# Patient Record
Sex: MALE | Race: WHITE | Employment: FULL TIME | URBAN - METROPOLITAN AREA
[De-identification: names, ages, dates, MRNs, and addresses within clinical notes are randomized per-mention and may not be internally consistent; named-entity substitution may affect disease eponyms.]

---

## 2020-09-10 ENCOUNTER — TELEPHONE (OUTPATIENT)
Dept: NEUROLOGY | Age: 60
End: 2020-09-10

## 2020-09-10 NOTE — TELEPHONE ENCOUNTER
----- Message from Ivy Weldon sent at 9/10/2020 12:52 PM EDT -----  Regarding: Dr. Osito Butts General Message/Vendor Calls    Caller's first and last name: Anisa Bailey (wife)      Reason for call: Regarding scheduling NP neurology eval referred by Dr. Gloria Pierre [phone].        Call back required yes/no and why: Yes       Best contact number(s): [phone]      Details to clarify the request:      Ivy Weldon

## 2020-09-29 ENCOUNTER — VIRTUAL VISIT (OUTPATIENT)
Dept: NEUROLOGY | Age: 60
End: 2020-09-29
Payer: COMMERCIAL

## 2020-09-29 DIAGNOSIS — G31.84 MILD COGNITIVE IMPAIRMENT WITH MEMORY LOSS: Primary | ICD-10-CM

## 2020-09-29 DIAGNOSIS — F43.22 ACUTE ADJUSTMENT DISORDER WITH ANXIETY: ICD-10-CM

## 2020-09-29 PROCEDURE — 96116 NUBHVL XM PHYS/QHP 1ST HR: CPT | Performed by: PSYCHOLOGIST

## 2020-09-29 NOTE — PROGRESS NOTES
This note will not be viewable in [address] Pursuant to the emergency declaration under the [facility], [facility] waiver authority and the [employer] Act, this Virtual Visit was conducted, with appropriate consent obtained, to reduce the patient's risk of exposure to COVID-19 and provide continuity of care   Services were provided in this manner to substitute for in-person clinic visit. The originating site is the patient's home and the distance site is [facility] at [facility]. These types of teleneuropsychology/telehealth/telemedicine visits were authorized by the President of the [employer], though I/we cannot guarantee what a third party payor will do reimbursement/coverage wise. I indicated that I would evaluate the patient and recommend diagnostics and treatment based on my assessment and impressions, and that our sessions are not being recorded and that personal health information is protected to the best of our abilities. [facility] at [address]    Office:  [phone]  Fax: [phone]                 Initial Office Exam    Patient Name: Lakeisha Mohan  Age: 61 y.o. Gender: male   Occupation:   Handedness: right handed   Presenting Concern: Memory Decline  Primary Care Physician: Rickey Rojas,   Referring Provider: Rubia Loza MD      REASON FOR REFERRAL:  This comprehensive and medically necessary neuropsychological assessment was requested to assist with a differential diagnosis of Memory disorder.   The use and purpose of this examination, as well as the extent and limitations of confidentiality, were explained prior to obtaining permission to participate. Instructions were provided regarding the necessity to put forth optimal effort and answer questions truthfully in order to obtain reliable and accurate test results. PERTINENT HISTORY:  Mr. Leena Turner presented for a neuropsychological assessment at the recommendation of his treating physician secondary to complaints of declining memory staring about  8 months ago and getting gradually worse. He has also reported agitation and mood swings. He has reported symptoms that include some mild visuospatial changes and slowed processing speed. He reports no significant concussions or other medical problems in his self or family. From a brief review of his medical and personal history there has not been any other significant neurological injury or illness noted or reported. He did not report experiencing depression or anxiety in the past.  He is currently on Zoloft to control so anxiety. Mr. Leena Turner does not  report any problems at birth or difficulties meeting developmental milestones. He reports that he had an adequate level of family support and was not subject to trauma or abuse as a child. Mr. Leena Turner does not  report being retain in school or receiving special assistance in any of he classes or subjects. Mr. Leena Turner completed 16 years of education. He attended [employer] [place] where he played basketball. Mr. Leena Turner does not exercise on a regular basis, but lives an active lifestyle and does  maintain a balanced diet. He does not report problems with sleep and does not complain of pain. He does participate in mentally stimulating activities. Mr. Leena Turner does  have concerns regarding work stressors, health concerns, mother's health, and other unspecified family stressors.   Mr. Leena Turner indicated that he is independent in his instrumental activities of daily living, including shopping, meal preparation, housekeeping, doing laundry, driving a car, managing medications, and finances. No current outpatient medications on file. No current facility-administered medications for this visit. No past medical history on file. No flowsheet data found. No data recorded    No past surgical history on file. Social History     Socioeconomic History    Marital status:      Spouse name: Not on file    Number of children: Not on file    Years of education: Not on file    Highest education level: Not on file       No family history on file. CT Results (most recent):  No results found for this or any previous visit. MRI Results (most recent):  No results found for this or any previous visit. MENTAL STATUS:    Orientation:  Fully oriented   Eye Contact:  Appropriate eye contact   Motor Behavior:   Ambulates independently   Speech:   Fluent and intelligible   Thought Process:  Clear and coherent   Thought Content:  No evidence of hallucinations or delusions   Suicidal ideations:  Denies   Mood:   Mildly anxious   Affect:   Congruent with stated mood   Concentration:   Decreased   Abstraction:   Within normal limits   Insight:   Adequate   Significant visuo-constructional apraxia  On the Modified Mini-Mental Status Exam: 89/100 (11%ile) Low Average      DIAGNOSTIC IMPRESSIONS:    ICD-10-CM ICD-9-CM    1. Mild cognitive impairment with memory loss  G31.84 331.83    2. Acute adjustment disorder with anxiety  F43.22 309.24      R/O Dementia        PLAN:  1. Complete a comprehensive neuropsychological assessment to provide a differential diagnosis of presenting concerns as well as to assist with disposition and treatment planning as appropriate. 2. Consider compensatory and remedial cognitive training. 3. Consider nonpharmacological interventions for anxiety. 4. Consider an adaptive driving evaluation. 91864 x 1 Review of records. Face to face interview w/ patient. Determine test protocol: 60 minutes.  Total 1 unit        Manish Schroeder, PhD, ABPP, CATIE  Licensed Clinical Psychologist/ Neuropsychologist        This note will not be viewable in [address]

## 2020-11-02 ENCOUNTER — OFFICE VISIT (OUTPATIENT)
Dept: NEUROLOGY | Age: 60
End: 2020-11-02
Payer: COMMERCIAL

## 2020-11-02 DIAGNOSIS — F43.22 ADJUSTMENT DISORDER WITH ANXIETY: ICD-10-CM

## 2020-11-02 DIAGNOSIS — F03.90 MAJOR NEUROCOGNITIVE DISORDER DUE TO ALZHEIMER'S DISEASE, PROBABLE, WITHOUT BEHAVIORAL DISTURBANCE: Primary | ICD-10-CM

## 2020-11-02 PROCEDURE — 96137 PSYCL/NRPSYC TST PHY/QHP EA: CPT | Performed by: PSYCHOLOGIST

## 2020-11-02 PROCEDURE — 96133 NRPSYC TST EVAL PHYS/QHP EA: CPT | Performed by: PSYCHOLOGIST

## 2020-11-02 PROCEDURE — 96138 PSYCL/NRPSYC TECH 1ST: CPT | Performed by: PSYCHOLOGIST

## 2020-11-02 PROCEDURE — 96136 PSYCL/NRPSYC TST PHY/QHP 1ST: CPT | Performed by: PSYCHOLOGIST

## 2020-11-02 PROCEDURE — 96132 NRPSYC TST EVAL PHYS/QHP 1ST: CPT | Performed by: PSYCHOLOGIST

## 2020-11-02 PROCEDURE — 96139 PSYCL/NRPSYC TST TECH EA: CPT | Performed by: PSYCHOLOGIST

## 2020-11-02 NOTE — PROGRESS NOTES
This note will not be viewable in [employer] for the following reason(s). Likely risk of substantial harm from The misinterpretation of data that is generated by this evaluation. Mr. Raymundo Munoz will receive a written copy of his full report and results during the feedback session. [facility] at [address]    Office:  [phone]  Fax: [phone]                                               Neuropsychological Evaluation Report    Patient Name: Mukesh Gandara  Age: 61 y.o. Gender: male   Occupation:   Handedness: right handed   Presenting Concern: Memory Decline  Primary Care Physician: Daisy Almodovar,   Referring Provider: Fausto Cosme MD       PATIENT HISTORY (OBTAINED DURING INITIAL CLINICAL EVALUATION):    REASON FOR REFERRAL:  This comprehensive and medically necessary neuropsychological assessment was requested to assist with a differential diagnosis of Memory disorder. The use and purpose of this examination, as well as the extent and limitations of confidentiality, were explained prior to obtaining permission to participate. Instructions were provided regarding the necessity to put forth optimal effort and answer questions truthfully in order to obtain reliable and accurate test results.     PERTINENT HISTORY:  Mr. Raymundo Munoz presented for a neuropsychological assessment at the recommendation of his treating physician secondary to complaints of declining memory staring about  8 months ago and getting gradually worse. He has also reported agitation and mood swings. He has reported symptoms that include some mild visuospatial changes and slowed processing speed. He reports no significant concussions or other medical problems in his self or family.  From a brief review of his medical and personal history there has not been any other significant neurological injury or illness noted or reported. He did not report experiencing depression or anxiety in the past.  He is currently on Zoloft to control so anxiety.     Mr. Raymundo Munoz does not  report any problems at birth or difficulties meeting developmental milestones. He reports that he had an adequate level of family support and was not subject to trauma or abuse as a child. Mr. Raymundo Munoz does not  report being retain in school or receiving special assistance in any of he classes or subjects. Mr. Raymundo Munoz completed 16 years of education. He attended [employer] [facility] [place] where he played basketball.     Mr. Raymundo Munoz does not exercise on a regular basis, but lives an active lifestyle and does  maintain a balanced diet. He does not report problems with sleep and does not complain of pain. He does participate in mentally stimulating activities. Mr. Raymundo Munoz does  have concerns regarding work stressors, health concerns, mother's health, and other unspecified family stressors. Mr. Raymundo Munoz indicated that he is independent in his instrumental activities of daily living, including shopping, meal preparation, housekeeping, doing laundry, driving a car, managing medications, and finances.       METHODS OF ASSESSMENT (Current Evaluation):  Clinician Administered:  Clinical Interview  Review of Medical Records  Clock Drawing Task  Modified Mini-Mental Status Exam (3MS)  Test of Premorbid [place] 488606 Anxiety and Depression Scale  Revised Memory and Behavior Checklist    Technician Administered:        Lopez Judgment of Line Orientation   Neuropsychological Assessment Battery   NAB: Judgment, visual discrimination, writing  RBANS-A  Reliable Digit Span  Test of Memory Malingering  Test of Practical Judgment (9-Item)  [place] Functional Living Scale  Trail Making Test  Word Choice Effort Test (ACS)    TEST OBSERVATIONS:  Mr. Raymundo Munoz arrived promptly for the testing session.   Dress and grooming were appropriate; physical presentation was unchanged from that observed during the clinical interview. Speech was fluent, intelligible, and goal-directed. Affect was congruent with the euthymic mood conveyed. Mr. Tod Hopson was adequately cooperative and appeared to put forth good effort throughout this examination. Rapport with the examiner was adequately established and maintained. Minimal prompting was required. Comprehension of test instructions was not problematic. Performance motivation was objectively measured by four instruments (TOMM, WC Effort, Reliable Digit Span, RBANS EI), and Mr. Tod Hopson produced a normal score on three of these measures. Accordingly, test findings below do not appear to be the product of disingenuous effort. Given the above observations, plus comments contained in the Mental Status section, the results of this examination are regarded as reasonably reliable and valid. TEST RESULTS:  Quantitative test results are derived from comparisons to age and education corrected cohort normative data, where applicable. Percentiles are included in these instances. Qualitative test results are determined using clinical observations. General Orientation and Awareness:       Orientation to person, year, month, day of month, day of week, state, town, and circumstance.    Awareness of deficits:  Aware                   Cognitive performance validity testing: VALID        Attention/Concentration:      Classification:   Coding (<0.1 percentile)           Severely Impaired  Simple visuomotor tracking (<0.1 percentile)               Severely Impaired  Digits forward (2 percentile)                 Moderately Impaired  Digits backward (4 percentile)                 Moderately Impaired    Visuospatial and Constructional Praxis:     Figure copy (25 percentile)                                       Average  Line orientation (<1 percentile)                                                  Severely Impaired   Visual discrimination (1 percentile)                Severely Impaired    Language:         Picture naming (<0.1 percentile)                               Severely Impaired  Semantic fluency (4 percentile)                    Moderately Impaired  Writing (69 percentile)                     Average     Memory and Learning:       Immediate word list learning (2 percentile)               Moderately Impaired  Delayed word list recall (50 percentile)                  Average  Delayed word list recognition (37 percentile)               Average  Immediate story memory (0.1 percentile)                  Severely Impaired  Delayed story recall (5 percentile)                Mildly Impaired  Delayed figure recall (8 percentile)                Mildly Impaired    Cognitive Tests of Executive Functioning:     Ability to think flexibly, Trail Making B (<0.1 percentile)              Severely Impaired  Simple judgment in daily decision making (14 percentile)             Low Average  Complex judgment in daily decision making (20 percentile)              Low Average    Intellectual and Basic Cognitive Functioning (WAIS-IV):  ACS Test of pre-morbid functioning reading recognition lower limit estimated WAIS-IV FSIQ score:       Estimated Full Scale IQ:          107   67 percentile       Average    Emotional Functioning:   HADS:   Depression = 4  Normal Range,   Anxiety = 7   Normal Range     GREER:  Mr. Chuck Holt was administered the GREER as part of his battery to assess his level of emotional functioning. Examination of his validity scales on this measure suggests a 6 that he has somewhat of a tendency to present himself in a favorable light, and is having relatively few common shortcomings to which most will admit.   His reluctance appears limited primarily to negative consequences associated with his behavior as a result he may minimize problems particularly with respect to disruptive effects that such problems have upon his life and the lives of others. Patients with a defensive style may play down areas where functioning might be less than optimal.  His overall clinical profile was entirely within normal limits there. There are no indications of significant psychopathology that are tapped by individual clinical scales of this measure. IMPRESSIONS:    Mr. Beulah Nevarez was seen for a comprehensive neuropsychological evaluation and administered a battery of measures assessing the neurocognitive domains of attention, language, visual-spatial, memory, and executive functioning his overall level of functioning was in the moderately impaired range averaged across the 5 domains. His individual domains ranged from severely impaired to mildly impaired. On the neurocognitive domains of language, memory, and executive functioning his performance all fell at the domain level in the mildly impaired range. His performance on individual measures of confrontational naming, semantic fluency, immediate word list learning, immediate and delayed story recall, and cognitive flexibility delayed figural recall were all in the impaired range. His performance on visual spatial/constructional abilities yielded a domain score in the moderately impaired range. His ability to copy a complex line figure was in the average range, but his perception of line orientation and visual discrimination were severely impaired. On measures within the attention and concentration domain, he was significantly impaired on measures of psychomotor processing. visual tracking. auditory attention. and working memory. His current findings represent a significant decline from his estimated average functioning premorbidly. Overall, Mr. Beulah Nevarez appears to be in the early stages of dementia, probable alzheimer's disease. 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 DIAGNOSTIC IMPRESSIONS:    ICD-10-CM ICD-9-CM    1. Major neurocognitive disorder due to Alzheimer's disease, probable, without behavioral disturbance ([facility] [address]  F01.50 331.0      294.10    2. Adjustment disorder with anxiety  F43.22 309.24          RECOMMENDATIONS:   1. Findings should be reviewed with Mr. Zuleyma Jaramillo to insure his understanding and discuss the potential implications. 2. Emphasis should be placed on Mr. Zuleyma Jaramillo obtaining good sleep hygiene and maintaining adequate physical exercise to promote good brain health. 3. Mr. Zuleyma Jaramillo may benefit from a referral to psychotherapy to address anxiety and work on adequate stress management skills. 4. Mr. Zuleyma Jaramillo is encouraged to seek out various forms of mental stimulation that would help to \"exercise\" his brain. This might include learning a new skill, hobby, travel, attending lectures, or evening reading or listening to podcasts or videos on topics of her interest.  5. Mr. Zuleyma Jaramillo should consider discontinuing driving, until he has completed a formal driving evaluation with DMV. 6. Mr. Zuleyma Jaramillo may need to have his medications monitored or supervised to insure that his is taking them properly  7. He is referred back to his neurologist for dementia management. Thank you for allowing me the opportunity to assist you in Mr. Radha pena. Please do not hesitate to contact me should you have additional questions that I may not have addressed. 85064 x 1  96138 x 1  96139 x 6  96132 x 1  96133 x 2          Camilla Villareal, Ph.D., ABPP  Licensed Clinical Psychologist  Neuropsychologist  Board Certified Rehabilitation Psychologist      Time Documentation:     14488 x 1: Neurobehavioral Status Exam/Clinical Interview: (1 hour, (already billed on first date of service)     23878*9 Neuropsych testing/data gathering by Neuropsychologist (35 additional minutes, see above)      96138 x 1  96139 x 6 Test Administration/Data Gathering By Technician: (3.5 hours). 87097 x 1 (first 30 minutes), 25480 x 7 (each additional 30 minutes)     96132 x 1  96133 x 1 Testing Evaluation Services by Neuropsychologist (1 hour, 50 minutes) 96132 x 1 (first hour), 96133 x 1 (50 minutes)     Definitions:       38186/59448:  Neurobehavioral Status Exam, Clinical interview. Clinical assessment of thinking, reasoning and judgment, by neuropsychologist, both face to face time with patient and time interpreting those test results and reporting, first and subsequent hours)     88361/26451: Neuropsychological Test Administration by Technician/Psychometrist, first 30 minutes and each additional 30 minutes. The above includes: Record review. Review of history provided by patient. Review of collaborative information. Testing by Clinician. Review of raw data. Scoring. Report writing of individual tests administered by Clinician. Integration of individual tests administered by psychometrist with NSE/testing by clinician, review of records/history/collaborative information, case Conceptualization, treatment planning, clinical decision making, report writing, coordination Of Care. Psychometry test codes as time spent by psychometrist administering and scoring neurocognitive/psychological tests under supervision of neuropsychologist.  Integral services including scoring of raw data, data interpretation, case conceptualization, report writing etcetera were initiated after the patient finished testing/raw data collected and was completed on the date the report was signed. This note will not be viewable in [address]